# Patient Record
Sex: FEMALE
[De-identification: names, ages, dates, MRNs, and addresses within clinical notes are randomized per-mention and may not be internally consistent; named-entity substitution may affect disease eponyms.]

---

## 2018-03-01 ENCOUNTER — HOSPITAL ENCOUNTER (INPATIENT)
Dept: HOSPITAL 31 - C.ER | Age: 58
LOS: 4 days | Discharge: HOME | DRG: 745 | End: 2018-03-05
Attending: PSYCHIATRY & NEUROLOGY | Admitting: PSYCHIATRY & NEUROLOGY
Payer: MEDICAID

## 2018-03-01 DIAGNOSIS — F10.230: ICD-10-CM

## 2018-03-01 DIAGNOSIS — K21.9: ICD-10-CM

## 2018-03-01 DIAGNOSIS — F13.230: ICD-10-CM

## 2018-03-01 DIAGNOSIS — F60.9: ICD-10-CM
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
## 2018-03-01 DIAGNOSIS — M62.838: ICD-10-CM

## 2018-03-01 DIAGNOSIS — M47.9: ICD-10-CM

## 2018-03-01 DIAGNOSIS — F11.23: Primary | ICD-10-CM

## 2018-03-01 DIAGNOSIS — G40.909: ICD-10-CM

## 2018-03-01 DIAGNOSIS — F41.1: ICD-10-CM

## 2018-03-01 DIAGNOSIS — I70.209: ICD-10-CM

## 2018-03-01 DIAGNOSIS — F17.210: ICD-10-CM

## 2018-03-01 DIAGNOSIS — I10: ICD-10-CM

## 2018-03-01 LAB
ALBUMIN SERPL-MCNC: 4.2 G/DL (ref 3.5–5)
ALBUMIN/GLOB SERPL: 1 {RATIO} (ref 1–2.1)
ALT SERPL-CCNC: 40 U/L (ref 9–52)
AST SERPL-CCNC: 23 U/L (ref 14–36)
BASOPHILS # BLD AUTO: 0 K/UL (ref 0–0.2)
BASOPHILS NFR BLD: 0.2 % (ref 0–2)
BILIRUB UR-MCNC: NEGATIVE MG/DL
BUN SERPL-MCNC: 16 MG/DL (ref 7–17)
CALCIUM SERPL-MCNC: 9.3 MG/DL (ref 8.6–10.4)
EOSINOPHIL # BLD AUTO: 0 K/UL (ref 0–0.7)
EOSINOPHIL NFR BLD: 0.3 % (ref 0–4)
ERYTHROCYTE [DISTWIDTH] IN BLOOD BY AUTOMATED COUNT: 14.5 % (ref 11.5–14.5)
GFR NON-AFRICAN AMERICAN: > 60
GLUCOSE UR STRIP-MCNC: NORMAL MG/DL
HGB BLD-MCNC: 13.8 G/DL (ref 11–16)
LEUKOCYTE ESTERASE UR-ACNC: (no result) LEU/UL
LYMPHOCYTES # BLD AUTO: 3.7 K/UL (ref 1–4.3)
LYMPHOCYTES NFR BLD AUTO: 36.8 % (ref 20–40)
MCH RBC QN AUTO: 30.6 PG (ref 27–31)
MCHC RBC AUTO-ENTMCNC: 34.2 G/DL (ref 33–37)
MCV RBC AUTO: 89.6 FL (ref 81–99)
MONOCYTES # BLD: 0.7 K/UL (ref 0–0.8)
MONOCYTES NFR BLD: 6.5 % (ref 0–10)
NEUTROPHILS # BLD: 5.7 K/UL (ref 1.8–7)
NEUTROPHILS NFR BLD AUTO: 56.2 % (ref 50–75)
NRBC BLD AUTO-RTO: 0 % (ref 0–2)
PH UR STRIP: 5 [PH] (ref 5–8)
PLATELET # BLD: 368 K/UL (ref 130–400)
PMV BLD AUTO: 7.8 FL (ref 7.2–11.7)
PROT UR STRIP-MCNC: NEGATIVE MG/DL
RBC # BLD AUTO: 4.51 MIL/UL (ref 3.8–5.2)
RBC # UR STRIP: NEGATIVE /UL
SP GR UR STRIP: 1.02 (ref 1–1.03)
SQUAMOUS EPITHIAL: 1 /HPF (ref 0–5)
URINE NITRATE: NEGATIVE
UROBILINOGEN UR-MCNC: NORMAL MG/DL (ref 0.2–1)
WBC # BLD AUTO: 10.1 K/UL (ref 4.8–10.8)

## 2018-03-01 PROCEDURE — HZ52ZZZ INDIVIDUAL PSYCHOTHERAPY FOR SUBSTANCE ABUSE TREATMENT, COGNITIVE-BEHAVIORAL: ICD-10-PCS | Performed by: PSYCHIATRY & NEUROLOGY

## 2018-03-01 PROCEDURE — HZ59ZZZ INDIVIDUAL PSYCHOTHERAPY FOR SUBSTANCE ABUSE TREATMENT, SUPPORTIVE: ICD-10-PCS | Performed by: PSYCHIATRY & NEUROLOGY

## 2018-03-01 PROCEDURE — HZ2ZZZZ DETOXIFICATION SERVICES FOR SUBSTANCE ABUSE TREATMENT: ICD-10-PCS | Performed by: PSYCHIATRY & NEUROLOGY

## 2018-03-01 PROCEDURE — HZ42ZZZ GROUP COUNSELING FOR SUBSTANCE ABUSE TREATMENT, COGNITIVE-BEHAVIORAL: ICD-10-PCS | Performed by: PSYCHIATRY & NEUROLOGY

## 2018-03-01 PROCEDURE — HZ56ZZZ INDIVIDUAL PSYCHOTHERAPY FOR SUBSTANCE ABUSE TREATMENT, PSYCHOEDUCATION: ICD-10-PCS | Performed by: PSYCHIATRY & NEUROLOGY

## 2018-03-01 PROCEDURE — HZ46ZZZ GROUP COUNSELING FOR SUBSTANCE ABUSE TREATMENT, PSYCHOEDUCATION: ICD-10-PCS | Performed by: PSYCHIATRY & NEUROLOGY

## 2018-03-01 NOTE — C.PDOC
History Of Present Illness





<Kwabena Wisdom M - Last Filed: 03/01/18 18:50>





<Rod Velazquez - Last Filed: 03/01/18 20:44>


57 yr old female presents to the ER for detox from opioids and Xanax. Patient 

is pre-screened. Patient denies SI, HI or any physical complaints.  (Kwabena Wisdom)


History Per: Patient


History/Exam Limitations: no limitations


Onset/Duration Of Symptoms: Persistent


Suicide/Self Injury Attempted (Context): None





<Kwabena Wisdom M - Last Filed: 03/01/18 18:50>





<Rod Velazquez - Last Filed: 03/01/18 20:44>


Time Seen by Provider: 03/01/18 17:57


Chief Complaint (Nursing): Substance Abuse





Past Medical History


Reviewed: Historical Data, Nursing Documentation, Vital Signs





- Medical History


PMH: Asthma, HTN


Family History: States: No Known Family Hx





- Social History


Hx Alcohol Use: Yes


Hx Substance Use: Yes





- Immunization History


Hx Tetanus Toxoid Vaccination: No


Hx Influenza Vaccination: Yes


Hx Pneumococcal Vaccination: No





<Kwabena Wisdom M - Last Filed: 03/01/18 18:50>


Vital Signs: 





 Last Vital Signs











Temp  97.9 F   03/01/18 17:24


 


Pulse  93 H  03/01/18 17:24


 


Resp  18   03/01/18 17:24


 


BP  133/91 H  03/01/18 17:24


 


Pulse Ox  100   03/01/18 18:50














Review Of Systems


Except As Marked, All Systems Reviewed And Found Negative.


Constitutional: Negative for: Fever


Cardiovascular: Negative for: Chest Pain


Respiratory: Negative for: Shortness of Breath


Psych: Negative for: Suicidal ideation





<Kwabena Wisdom M - Last Filed: 03/01/18 18:50>





Physical Exam





- Physical Exam


Appears: Non-toxic, No Acute Distress


Skin: Warm, Dry, No Rash


Oral Mucosa: Moist


Lips: Normal Appearing


Cardiovascular: Rhythm Regular, No Murmur


Respiratory: Normal Breath Sounds, No Rales, No Rhonchi, No Stridor, No Wheezing


Gastrointestinal/Abdominal: Normal Exam, Soft, No Tenderness, No Guarding, No 

Rebound


Neurological/Psych: Oriented x3, Normal Speech





<Kwabena Wisdom - Last Filed: 03/01/18 18:50>





ED Course And Treatment





- Laboratory Results


Result Diagrams: 


 03/01/18 18:24





 03/01/18 18:24


O2 Sat by Pulse Oximetry: 100 (RA)


Pulse Ox Interpretation: Normal


Progress Note: Patient is medically cleared.





<Kwabena Wisdom AMINA - Last Filed: 03/01/18 18:50>





- Laboratory Results


Result Diagrams: 


 03/01/18 18:24





 03/01/18 18:24





<VelazquezShelbyRod R - Last Filed: 03/01/18 20:44>





Medical Decision Making





<Kwabena Wisdom - Last Filed: 03/01/18 18:50>





<Rod Velazquez R - Last Filed: 03/01/18 20:44>


Medical Decision Making: 


PLAN:


* EKG


* Alcohol Serum 


* Drug Screen 


* Labs


* Urinalysis 


 (Kwabena Wisdom)





Disposition





<Kwabena Wisdom AMINA - Last Filed: 03/01/18 18:50>


Discussed With : Matt Orozco


Doctor Will See Patient In The: Hospital


Counseled Patient/Family Regarding: Diagnosis





- Disposition


Disposition Time: 20:43





<Rod Velazquez - Last Filed: 03/01/18 20:44>





- Disposition


Disposition: HOSPITALIZED


Condition: STABLE


Forms:  [facility] (English)





- Clinical Impression


Clinical Impression: 


 Drug abuse








- Scribe Statement


The provider has reviewed the documentation as recorded by the Scribe





<Kwabena Wisdom AMINA - Last Filed: 03/01/18 18:50>





<Rod Velazquez - Last Filed: 03/01/18 20:44>





- Scribe Statement


Frances Diallo (Kwabena Wisdom)


Provider Attestation: 


All medical record entries made by the Scribe were at my direction and 

personally dictated by me. I have reviewed the chart and agree that the record 

accurately reflects my personal performance of the history, physical exam, 

medical decision making, and the department course for this patient. I have 

also personally directed, reviewed, and agree with the discharge instructions 

and disposition. (Kwabena Wisdom)

## 2018-03-01 NOTE — PCM.BM
<Amira Howard - Last Filed: 03/01/18 22:52>





Treatment Plan Problems





- Problems identified on initial assessmt


  ** Potential for benzo withdrawal


Date Initiated: 03/01/18


Time Initiated: 22:53


Date resolved: 03/01/18


Assessment reference: NA


Status: Active


Priority: 1





Treatment assets and liabiliti


Patient Assests: negotiates basic needs, cognitively intact


Patient Liabilities: substance abuse





- Milieu Protocol


Maintain good personal hygiene: daily Encourage regular showers, daily Remind 

patient to perform daily oral care, daily Assist patient to perform ADL's


Conduct patient checks and document Observation sheet: Q15 minutes


Maintain personal safety: every shift Educate patient to report safety concerns 

to staff, every shift Monitor environment for contraband/sharps


Medication safety: Monitor for expected outcome, potential side effects: every 

shift, Assess barriers to learning: every shift, Assess readiness for 

medication education: every shift





<Matt Orozco - Last Filed: 03/02/18 15:32>





- Diagnosis


(1) Opioid use disorder, severe, dependence


Status: Acute   


Interventions: 





03/02/18 15:32


* Assess 7x/week regarding severity of withdrawal


* Educate regarding risks, benefits, side effects and alternatives of 

medications


* Use Motivational Interviewing for abstinence


* Use CBT for relapse prevention


* Medication management for withdrawal symptoms


* Encourage medication assisted treatment


* 








(2) Sedative, hypnotic or anxiolytic use disorder, severe, dependence


Status: Acute   


Interventions: 





03/02/18 15:32


* Assess 7x/week regarding severity of withdrawal


* Educate regarding risks, benefits, side effects and alternatives of 

medications


* Use Motivational Interviewing for abstinence


* Use CBT for relapse prevention


* Medication management for withdrawal symptoms


* Encourage medication assisted treatment


*

## 2018-03-02 RX ADMIN — ALBUTEROL SULFATE PRN PUFF: 90 AEROSOL, METERED RESPIRATORY (INHALATION) at 21:21

## 2018-03-02 RX ADMIN — Medication SCH TAB: at 09:22

## 2018-03-02 RX ADMIN — VITAMIN A AND VITAMIN D PRN EA: 929.3 OINTMENT TOPICAL at 22:24

## 2018-03-02 RX ADMIN — PANTOPRAZOLE SODIUM SCH MG: 40 TABLET, DELAYED RELEASE ORAL at 10:27

## 2018-03-02 NOTE — PCM.PSYCH
Initial Psychiatric Evaluation





- Initial Psychiatric Evaluation


Type of Admission: Voluntary


Legal Status: Capacity


Chief Complaint (in patient's own words): 





"I came here for detox"


History of Present Illness and Precipitating Events: 


58 yo F, single (recently lost her significant other of >20yrs, 3 months ago), 

with two children (36 yo & 37 yo), unemployed on disability, from [place] 

but currently living with one of her daughters in [place], [place], who presents to 

us for detox from benzodiazepines. Patient admits to >4mg Xanax/day for 

multiple years now. She even told our coordinator that she uses 6 mg "or more." 

She frequently takes more than her prescribed doses a day. Additionally, she 

will take other benzos such as Ativan when available to her. Patient is 

currently receiving chronic pain management in [place] for several years 

as well. Her regimen consists of 60mg MS Contin with 10mg Oxycodone for 

breakthru pain. She admits to use of heroin, methadone, and subutex in the 

past. Currently smokes 1ppd. Denies use of cocaine, marijuana or any other 

substances. Of note, she has a history of recurrent seizures in the past, with 

unknown etiology to her. Denies any prior overdoses, SI or attempts. Patient is 

otherwise in NAD. Denies any other complaints at this time. Her current plan 

after d/c includes stress care clinic. She is open to additional services and 

would like them to be closer to her daughters home.





Detox Hx: Reports 4-5x for heroin in the past


Rehab Hx: Denies


Medical Hx: HTN, DVT, PAD (has bilateral angioplastys with stents), AAA (with 

stent), hiatal hernia, GERD, DJD (lumbar spine), Seizures


Medications: Prilosec 40mg, Lisinopril 10mg, Norvasc 10mg, Flexeril, Dilantin, 

Reglan; Has taken Paxil in the past


Psych Hx: "Social Anxiety disorder"


Fam Hx: Denies


Current Medications: 





Active Medications











Generic Name Dose Route Start Last Admin





  Trade Name Freq  PRN Reason Stop Dose Admin


 


Amlodipine Besylate  10 mg  03/02/18 10:15  03/02/18 10:26





  Norvasc  PO   10 mg





  DAILY ROWDY   Administration


 


Aspirin  81 mg  03/02/18 10:00  03/02/18 09:22





  Ecotrin  PO   81 mg





  DAILY ROWDY   Administration


 


Chlordiazepoxide  25 mg  03/02/18 00:00  03/02/18 06:09





  Librium  PO  03/07/18 23:59  25 mg





  Q6H ROWDY   Administration





  Taper   


 


Chlordiazepoxide  25 mg  03/01/18 23:24  





  Librium  PO   





  Q4H PRN   





  Alcohol Withdrawal   


 


Clonidine HCl  0.1 mg  03/01/18 23:24  





  Catapres  PO   





  Q4H PRN   





  Symptoms of alcohol withdrawl   


 


Cyclobenzaprine HCl  5 mg  03/01/18 22:34  03/02/18 10:26





  Flexeril  PO   5 mg





  Q6 PRN   Administration





  muscle spasm   


 


Folic Acid  1 mg  03/02/18 10:00  03/02/18 09:22





  Folic Acid  PO   1 mg





  DAILY ROWDY   Administration


 


Ibuprofen  400 mg  03/01/18 23:30  





  Motrin Tab  PO   





  Q6H PRN   





  Pain, moderate (4-7)   


 


Lisinopril  10 mg  03/02/18 10:15  03/02/18 10:26





  Zestril  PO   10 mg





  DAILY ROWDY   Administration


 


Multivitamins  1 tab  03/02/18 10:00  03/02/18 09:22





  Hexavitamin  PO   1 tab





  DAILY ROWDY   Administration


 


Pantoprazole Sodium  40 mg  03/02/18 10:15  03/02/18 10:27





  Protonix Ec Tab  PO   40 mg





  DAILY ROWDY   Administration


 


Phenytoin Sodium  100 mg  03/02/18 10:00  03/02/18 09:23





  Dilantin  PO   100 mg





  TID ROWDY   Administration


 


Quetiapine Fumarate  50 mg  03/01/18 22:39  03/01/18 22:43





  Seroquel  PO   50 mg





  HS ROWDY   Administration


 


Thiamine HCl  100 mg  03/02/18 10:00  03/02/18 09:22





  Vitamin B1 Tab  PO   100 mg





  DAILY ROWDY   Administration














Past Psychiatric History





- Past Psychiatric History


History of Abuse: 





denies


History of ETOH/Drug Use: 


(+) opioids, benzos, heroin


Pertinent Medical Hx (Current Medical&Sleep Prob, Allergies): 





 Allergies











Allergy/AdvReac Type Severity Reaction Status Date / Time


 


risperidone [From Risperdal] Allergy   Verified 03/01/18 17:24


 


tape Allergy   Uncoded 03/01/18 17:24














Review of Systems





- Constitutional


Constitutional: absent: Fever, Chills, Sweats





- Neurological


Neurological: absent: Dizziness, Numbness, Focal Weakness, Headaches, Tremor, 

Weakness





- Psychiatric


Psychiatric: Anxiety, Irritability.  absent: Auditory Hallucinations, Confusion

, Hallucinations, Panic Attacks, Paranoia, Suicidal Ideation


Additional comments: 





(+) sleeping disturbances





Mental Status Examination





- Personal Presentation


Personal Presentation: Looks stated age





- Affect


Affect: Broad





- Motor Activity


Motor Activity: Calm





- Reliability in Providing Information


Reliability in Providing Information: Fair





- Speech


Speech: Disorganized





- Mood


Mood: Anxious





- Formal Thought Process


Formal Thought Process: No Impairment





- Obsessions/Compulsions


Obsessions: No


Compulsions: No





- Cognitive Functions


Orientation: Person, Place, Situation, Time


Sensorium: Alert


Attention/Concentration: Easily distracted


Abstract Thinking: [place]


Estimate of Intelligence: Average


Judgement: Intact, as evidence by: Insight regarding need for hospitalization


Memory: Recent intact, as evidence by: Ability to recall events of the day, 

Remote intact, as evidenced by: Abilit to recall sig. life events


Additional comments: 





Patient ambulates with cane





- Risk


Risk: Seizure, Withdrawal, Falls, Diminished functioning





- Limitations


Limitations: Other ((+) living situation complicated with her daughter, recent 

passing of her significant other)





DSM 5 DX





- DSM 5


DSM 5 Diagnosis: 





Opioid Withdrawal


Opioid Use d/o - severe


Sedative hypnotic anxiolytic use d/o severe, with withdrawal


Anxiety d/o - unspecified


Personality d/o- unspecified


r/o TAMAR





- Recommended/Plan of Treatment


Treatment Recommendations and Plan of Treatment: 





Librium detox


Subutex detox when she starts to score more


Patient declines use of Gabapentin (states that it makes her "gain weight")


She doesn't want SSRIs and many other meds with vague concerns. Rigid about meds

, except for xanax


As needed medications


All risks, benefits and alternatives of the meds discussed, and the pt agreed 

and understood. 


Attend groups and activities


Supportive therapy and psychoeducation


MI for abstinence


CBT for relapse prevention


Encourage MAT


Refer to rehab or IOP, and self-help groups


Smoking cessation with MI


Nicotine patch





34 min





Projected ELOS: 4-5 days


Prognosis: Good with treatment





- Smoking Cessation


Smoking Cessation Initiated: Yes

## 2018-03-03 RX ADMIN — Medication SCH TAB: at 09:53

## 2018-03-03 RX ADMIN — PANTOPRAZOLE SODIUM SCH MG: 40 TABLET, DELAYED RELEASE ORAL at 09:54

## 2018-03-03 NOTE — PCM.PYCHPN
Psychiatric Progress Note





- Psychiatric Progress Note


Patient seen today, length of contact: 15 min


Patient Chief Complaint: 





I am still withdrawing.'


Problems Identified/Issues Discussed: 








Patient seen and evaluated, chart reviewed and discussed with the nurse.





Patient reports withdrawal symptoms nausea, anxiety, and headaches. She reports 

improvement in her mood and denies any feelings of hopelessness and 

helplessness. Patient denies any auditory or visual hallucinations, or any 

psychotic symptoms. She reports improvement in his sleep and appetite. She is 

tolerating the withdrawal medications and denies any side effects. 





Supportive therapy and psychoeducation were given.


Medication Change: Yes


Medical Record Reviewed: Yes





Mental Status Examination





- Cognitive Function


Orientation: Person, Place, Situation, Time


Memory: Intact


Attention: WNL


Concentration: Poor


Association: WNL


Fund of Knowledge: Poor





- Mood


Mood: Anxious





- Affect


Affect: Broad





- Speech


Speech: Soft





- Formal Thought Process


Formal Thought Process: No Impairment





- Suicidal Ideation


Suicidal Ideation: No





- Homicidal Ideation


Homicidal Ideation: No





Goal/Treatment Plan





- Goal/Treatment Plan


Need for Continued Stay: Severe depression anxiety, Severe functional impairment


Progress Toward Problem(s) and Goals/Treatment Plan: 





Opioid Withdrawal


Opioid Use d/o - severe


Sedative hypnotic anxiolytic use d/o severe, with withdrawal


Anxiety d/o - unspecified


Personality d/o- unspecified


r/o TAMAR











Librium detox


Subutex detox when she starts to score more


Patient declines use of Gabapentin (states that it makes her "gain weight")


She doesn't want SSRIs and many other meds with vague concerns. Rigid about meds

, except for xanax


As needed medications


All risks, benefits and alternatives of the meds discussed, and the pt agreed 

and understood. 


Attend groups and activities


Supportive therapy and psychoeducation


MI for abstinence


CBT for relapse prevention


Encourage MAT


Refer to rehab or IOP, and self-help groups


Smoking cessation with MI


Nicotine patch





- Smoking Cessation


Smoking Cessation Initiated: No

## 2018-03-03 NOTE — CARD
--------------- APPROVED REPORT --------------





EKG Measurement

Heart [id number]

[id number]

[id number]

[id number]

[id number]



<Conclusion>

Normal sinus rhythm

Possible Left atrial enlargement

Borderline ECG

## 2018-03-04 VITALS — RESPIRATION RATE: 18 BRPM

## 2018-03-04 RX ADMIN — Medication SCH TAB: at 09:27

## 2018-03-04 RX ADMIN — PANTOPRAZOLE SODIUM SCH MG: 40 TABLET, DELAYED RELEASE ORAL at 09:26

## 2018-03-04 NOTE — PCM.PYCHPN
Psychiatric Progress Note





- Psychiatric Progress Note


Patient seen today, length of contact: 15 min


Patient Chief Complaint: 





I am still experiencing feeling a lot of pain.'


Problems Identified/Issues Discussed: 





Patient seen and evaluated, chart reviewed and discussed with the nurse. 

Patient reports a lot of pain in her back and knees, and planning to see pain 

doctor after discharge. She still reports withdrawal symptoms nausea, anxiety, 

sweating and headaches. She reports improvement in her mood and denies any 

feelings of hopelessness and helplessness. Patient denies any auditory or 

visual hallucinations, or any psychotic symptoms. She reports improvement in 

his sleep and appetite. She is tolerating the withdrawal medications and denies 

any side effects. 





Supportive therapy and psychoeducation were given.


Medication Change: Yes (librium taper)


Medical Record Reviewed: Yes





Mental Status Examination





- Cognitive Function


Orientation: Person, Place, Situation, Time


Memory: Intact


Attention: WNL


Concentration: Poor


Association: WNL


Fund of Knowledge: Poor





- Mood


Mood: Anxious





- Affect


Affect: Broad





- Speech


Speech: Soft





- Formal Thought Process


Formal Thought Process: No Impairment





- Suicidal Ideation


Suicidal Ideation: No





- Homicidal Ideation


Homicidal Ideation: No





Goal/Treatment Plan





- Goal/Treatment Plan


Need for Continued Stay: Severe depression anxiety, Severe functional impairment


Progress Toward Problem(s) and Goals/Treatment Plan: 





Opioid Withdrawal


Opioid Use d/o - severe


Sedative hypnotic anxiolytic use d/o severe, with withdrawal


Anxiety d/o - unspecified


Personality d/o- unspecified


r/o TAMAR











Librium detox


Subutex detox when she starts to score more


Patient declines use of Gabapentin (states that it makes her "gain weight")


She doesn't want SSRIs and many other meds with vague concerns. Rigid about meds

, except for xanax


As needed medications


All risks, benefits and alternatives of the meds discussed, and the pt agreed 

and understood. 


Attend groups and activities


Supportive therapy and psychoeducation


MI for abstinence


CBT for relapse prevention


Encourage MAT


Refer to rehab or IOP, and self-help groups


Smoking cessation with MI


Nicotine patch





- Smoking Cessation


Smoking Cessation Initiated: No

## 2018-03-05 VITALS
OXYGEN SATURATION: 98 % | TEMPERATURE: 98 F | SYSTOLIC BLOOD PRESSURE: 138 MMHG | HEART RATE: 80 BPM | DIASTOLIC BLOOD PRESSURE: 82 MMHG

## 2018-03-05 RX ADMIN — Medication SCH TAB: at 08:59

## 2018-03-05 RX ADMIN — VITAMIN A AND VITAMIN D PRN EA: 929.3 OINTMENT TOPICAL at 09:00

## 2018-03-05 RX ADMIN — PANTOPRAZOLE SODIUM SCH MG: 40 TABLET, DELAYED RELEASE ORAL at 09:00

## 2018-03-05 RX ADMIN — ALBUTEROL SULFATE PRN PUFF: 90 AEROSOL, METERED RESPIRATORY (INHALATION) at 09:01

## 2018-03-05 NOTE — PCM.PYCHDC
Mental Status Examination





- Mental Status Examination


Orientation: Person, Place, Situation, Time


Memory: Intact


Mood: Neutral


Affect: Broad


Speech: Appropriate


Attention: WNL


Concentration: WNL


Association: WNL


Fund of Knowledge: WNL


Formal Thought Process: No Impairment


Suicidal Ideation: No


Current Homicidal Ideation?: No





Discharge Summary





- Discharge Note


Reason for Hospitalization: 


Opioid use d/o


Opioid withdrawal


Consultations:: List each consultation separately and include:  1. Reason for 

request.  2. Findings.  3. Follow-up


Summary of Hospital Course include:: 1. Description of specific treatment plan 

utilized for patients during their course of treatmen.  2. Summarize the time-

course for resolution of acute symptoms and/or regressed behaviors.  3. 

Describe issues identified and worked on during hospitalization.  4. Describe 

medication utilized.  5. Describe medical problems identified and treated.  6. 

Reassessment of suicide risk


Summary of Hospital Course: 


HPI:


56 yo F, single (recently lost her significant other of >20yrs, 3 months ago), 

with two children (34 yo & 39 yo), unemployed on disability, from [place] 

but currently living with one of her daughters in [place], [place], who presents to 

us for detox from benzodiazepines. Patient admits to >4mg Xanax/day for 

multiple years now. She even told our coordinator that she uses 6 mg "or more." 

She frequently takes more than her prescribed doses a day. Additionally, she 

will take other benzos such as Ativan when available to her. Patient is 

currently receiving chronic pain management in [place] for several years 

as well. Her regimen consists of 60mg MS Contin with 10mg Oxycodone for 

breakthru pain. She admits to use of heroin, methadone, and subutex in the 

past. Currently smokes 1ppd. Denies use of cocaine, marijuana or any other 

substances. Of note, she has a history of recurrent seizures in the past, with 

unknown etiology to her. Denies any prior overdoses, SI or attempts. Patient is 

otherwise in NAD. Denies any other complaints at this time. Her current plan 

after d/c includes stress care clinic. She is open to additional services and 

would like them to be closer to her daughters home.





Detox Hx: Reports 4-5x for heroin in the past


Rehab Hx: Denies


Medical Hx: HTN, DVT, PAD (has bilateral angioplastys with stents), AAA (with 

stent), hiatal hernia, GERD, DJD (lumbar spine), Seizures


Medications: Prilosec 40mg, Lisinopril 10mg, Norvasc 10mg, Flexeril, Dilantin, 

Reglan; Has taken Paxil in the past


Psych Hx: "Social Anxiety disorder"


Fam Hx: Denies





Hospital Course:


The pt was admitted and started on treatment with psychotherapy, support, 

psychoeducation and medications. MI and CBT used.The pt attended groups and 

activities, as well as milieu therapy.Patient successfully completed the full 

course of detox. No remarkable events were noted. Patient did report 

experiencing flares ups of her chronic back and bilateral leg pain during her 

stay. It was recommended to her to seek further care with outpatient physical 

therapy. All the risks and benefits of medications are discussed and the 

patient understood and agreed. The pt improved with the treatments provided. 

After care discussed with the patient.

















- Diagnosis


(1) Opioid use disorder, severe, dependence


Status: Acute   





(2) Sedative, hypnotic or anxiolytic use disorder, severe, dependence


Status: Acute   





- Final Diagnosis (DSM 5)


Condition upon Discharge: STABLE


Disposition: HOME/ ROUTINE


Follow-up Treatment Plan: 





Continue below medications after discharge.


Follow after care plan as discussed.


Use relapse prevention skills


Return to ER or call 911 if suicidal, homicidal or symptoms relapse.


Stay away from stress, alcohol and drugs.


See primary doctor regularly and get labs.








Prescriptions/Medication Reconciliation: 


amLODIPine [Norvasc] 10 mg PO DAILY #30 tab


Aspirin [Ecotrin] 81 mg PO DAILY #30 tabec


chlordiazePOXIDE [Librium] 25 mg PO BID #2 cap


Lisinopril [Zestril] 10 mg PO DAILY #30 tab


Pantoprazole [Protonix EC Tab] 40 mg PO DAILY #30 ect


Phenytoin, Extended [Dilantin] 100 mg PO TID #90 cer


QUEtiapine [SEROquel] 50 mg PO HS #30 tab